# Patient Record
(demographics unavailable — no encounter records)

---

## 2024-10-24 NOTE — PHYSICAL EXAM
[de-identified] :  Gen: NAD Resp: Nonlabored respirations, no SOB   Shoulder: Skin intact Tender over posterolateral deltoid 90/60/10/lumbar AROM 4/5 ER IR 4p/5 Abduction (+) Franchesca/Christine (+) Belly press/bear hug (+) Speed/yergason 5/5 D B T EPL FDP IO SILT amur 2+ rad bcr   1+ ant/post instability (-) O'gerson's (-) load and release (-) apprehension (-) Steve Chavez (-) sulcus sign (-) AC pain, cross body adduction  [de-identified] :  The following radiographs were ordered and read by me during this patient's visit. I reviewed each radiograph in detail with the patient and discussed the findings as highlighted below.   3 views of the L + R shoulder were obtained today that show severe GH OA with large goatsbeard osteophyte, B2 glenoid on R shoulder. There are no degenerative changes seen. There is no malalignment. No obvious osseous abnormality. Otherwise unremarkable.

## 2024-10-24 NOTE — DISCUSSION/SUMMARY
[de-identified] : 74yF pw severe bl GH OA.  Holding off on surgical discussion as just had neuro meningioma and wants to wait.  The patient was extensively counseled on treatment options including but not limited to observation, rest/activity modification, bracing, anti-inflammatory medications, physical therapy, injections, and surgery.  The natural history of the disease was thoroughly explained.  We discussed that the majority of the time, this condition can be initially treated conservatively. The patient will proceed with: -PT -diclofenac rx provided - pt instructed to take with meals and not with other nsaid's including advil, aleve, and toradol.  The pt understands to stop taking the medication if any stomach sx develop or they develop any type of bleeding or bruising, at which point they will present to their PMD -pt was instructed on the importance of resting, icing and elevating to minimize swelling -RTC 6w   I have personally obtained the history, reviewed the ROS as noted, and performed the physical examination today.  The patient and I discussed the assessment and options and developed the plan.  All questions were answered and the patient stated their understanding of the treatment plan and appreciation of the visit.   My cumulative time spent on this patient's visit included: Preparation for the visit, review of the medical records, review of pertinent diagnostic studies, examination and counseling of the patient on the above diagnosis, treatment plan and prognosis, orders of diagnostic tests, medications and/or appropriate procedures and documentation in the medical records of today's visit.   Anuel Arias MD

## 2024-10-24 NOTE — HISTORY OF PRESENT ILLNESS
[de-identified] : Ciarra is a 73yo F pw bilateral shoulder pain. Ongoing pain for over a year, Denies any previous injury or hx of surgery. Pain is described as dull and constant, 8/10 and impacts quality of life; pain is worse with movement. Unable to lay down comfortably. Can't sleep due to pain. L slightly > R.  Has done HEP, no inj.  No adv imaging. Well controlled DM, A1c 6.4 [facility] .   8/22 - making some progress with PT but great pain relief from the injection on the R side.  CT L shoulder complete She has been getting worked up for a possible brain mass and would like to have that evaluated more first prior to any surgical discussions  10/24 - neuro workup resulted in benign meningioma - will be monitored w serial imaging x1y L shoulder pain still causing 7-8/10 persistent soreness, modest relief from combination of PT + inj Wants to consider surgery after holidays, reverse total shoulder

## 2024-11-25 NOTE — ADDENDUM
[FreeTextEntry1] : POCT glucose testing and Hemoglobin A1c was carried out today given diabetes diagnosis.  This note was written by So Guillen on 11/25/2024 acting as medical scribe for Dr. Mat Collado. I, Dr. Mat Collado, have read and attest that all the information, medical decision making and discharge instructions within are true and accurate.

## 2024-11-25 NOTE — HISTORY OF PRESENT ILLNESS
[FreeTextEntry1] : Ms. Gray is a 75-year-old female who returns today in follow up with regard to a history of type 2 diabetes mellitus. There is no known history of retinopathy, or nephropathy. With regard to neuropathy, she denies any neurologic s/s.  ROS: has b/l shoulder pain (dx arthritis on CT) and finished PT this past Saturday per ortho Dr. Arias. Did receive injection therapy with some improvement in addition to PT.   Current DM medication include Glimepiride 1.5 tablets in AM, Metformin 1000mg BID, and Repaglinide 2 mg up to two before lunch and usually one before dinner.   Home glucose monitoring has shown values of late to be She does deny any significant hypoglycemic signs and symptoms of late.  Diet: Is maintaining carb-consistent diet.  Activity: Is trying to stay physically active with walking, stretching, bicycle.   POCT A1C returned today at 6.2%.  POCT glucose today at 79 mg/dL.   Denies any chest pain, sob, neurologic or ophthalmologic complaints. He too denies any new podiatric concerns. Ophthalmologic evaluation is up to date with Dr. Yvonne Burgess- no diabetic changes noted. ____________________________________________________________________________________________________  Per Dr. Schultz, the patient underwent MR Head on 08/07/2024 which incidentally showed a left pituitary mass.   MR Angio Head on 09/09/2024 per Dr. Nissenbaum then showed that there is a hypoenhancing mass within the left side of the sella turcica measuring 1.5 cm anterior to posterior by 1.4 cm in width by 1.2 cm craniocaudad. Associated mild deviation of the pituitary infundibulum towards the right.  ____________________________________________________________________________________________________  The patent does also have a history of Nodular Thyroid.   She underwent Thyroid US on 10/23/2024 showing: - In the right lobe, there is a 10 x 6 x 7 mm upper pole solid and partially cystic nodule (TIRAD 2). There are multiple colloid cysts measuring up to 9 mm. - In the left lobe, there is a 1.2 cm cystic nodule as well as scattered less than 5 mm nodules (TIRAD 2).  Denies anterior neck discomfort, difficulty swallowing, or any change in the appearance of the neck region. ____________________________________________________________________________________________________  Additional diagnoses include that of hyperlipidemia, hypertension, and vitamin d deficiency.  Additional Medications: Rosuvastatin, Losartan 50mg, HCTZ 12.5mg Supplements: vitamin D3 5,000 IU daily, magnesium 250 mg, B-12 1000 daily, Dr Amanda TRISTAN.

## 2024-12-11 NOTE — HISTORY OF PRESENT ILLNESS
[FreeTextEntry1] : This is a 75 year y/o female with a PMHx of HTN, HLD, DM, LVH, CAD,   today coming in for follow up.  The patient denies fever, chills, sore throat, loss of taste or smell, muscle aches weight loss, malaise, rash, recent travel, insect bites, alteration bowel habits, headaches, weakness, abdominal pain, bloating, changes in urination, visual disturbances, shortness of breath, chest pain, dizziness, palpitations.  The patient here for evaluation of high blood pressure. Patient is currently tolerating the current antihypertensive regime and they deny headaches, stiff neck, visual changes, or PND. The patient has been trying to stay on a low-sodium diet.  The patient is here for follow-up of elevated cholesterol. Patient is currently tolerating medication and denies muscle pain, joint pain, back pain, urinary changes , nausea, vomiting, abdominal pain or diarrhea. The patient is trying to follow a low cholesterol diet.  The patient also presents for continued care of diabetes mellitus. Patient is following the diabetic regimen. Patient is tolerating hypoglycemic agents and following the diet. Patient denies any visual changes, blood in the urine, abdominal pain, nausea, vomiting, myalgias, arthralgias, paresthesias and syncope. The patient denies any chest discomfort, shortness of breath or new neurologic signs or symptoms. Patient denies any polyuria, worsening nocturia, or polydipsia.  The patient presents for routine follow up of their coronary artery disease. The patient has been following all secondary prevents measures and antiplatelet therapy. The patient denies shortness of breath, chest pain, dizziness, palpitations.  10/24/24- Thyroid sonogram- IMPRESSION: Normal-size thyroid gland without suspicious nodules. TI-RAD 2: Not suspicious (No FNA)  12/7/24- labs a1c 6.6, ldl 64

## 2025-04-03 NOTE — PHYSICAL EXAM
No Charge  D/C EEG wires at [facility]  Dr. Bean   [Well Developed] : well developed [Well Nourished] : well nourished [No Acute Distress] : no acute distress [Normal Conjunctiva] : normal conjunctiva [Normal Venous Pressure] : normal venous pressure [No Carotid Bruit] : no carotid bruit [Normal S1, S2] : normal S1, S2 [No Murmur] : no murmur [No Rub] : no rub [No Gallop] : no gallop [Clear Lung Fields] : clear lung fields [Good Air Entry] : good air entry [No Respiratory Distress] : no respiratory distress  [Soft] : abdomen soft [Non Tender] : non-tender [No Masses/organomegaly] : no masses/organomegaly [Normal Bowel Sounds] : normal bowel sounds [Normal Gait] : normal gait [No Edema] : no edema [No Cyanosis] : no cyanosis [No Clubbing] : no clubbing [No Varicosities] : no varicosities [No Rash] : no rash [No Skin Lesions] : no skin lesions [Moves all extremities] : moves all extremities [No Focal Deficits] : no focal deficits [Normal Speech] : normal speech [Alert and Oriented] : alert and oriented [Normal memory] : normal memory

## 2025-04-08 NOTE — HISTORY OF PRESENT ILLNESS
[FreeTextEntry1] : Ms. Gray is a 75-year-old female who returns today in follow up with regard to a history of type 2 diabetes mellitus. There is no known history of retinopathy, or nephropathy. With regard to neuropathy, she denies any neurologic s/s.  Has b/l shoulder pain (dx arthritis on CT) and finished PT 11/23/24 per ortho Dr. Arias. Did receive injection therapy with some improvement in addition to PT.   Current DM medication include Glimepiride 1.5 tablets in AM, Metformin 1000mg BID, and Repaglinide 2 mg two tabs pre-lunch and one tab pre-dinner. Overall, tolerating the medications well.  Home glucose monitoring has shown values of late to be 130-140. She does deny any significant hypoglycemic signs and symptoms of late.  Diet: Is maintaining carb-consistent diet.  Activity: Is trying to stay physically active with walking, stretching, bicycle.  Recntly returned from 1-month trip to [facility] and walked frequently.  POCT A1C returned today at 6.3%. Previously returned at 6.6% on 12/07/24. POCT glucose today at 103 mg/dL.  She denies polyuria, polydipsia, or any visual changes. She also denies any skin lesions, skin breakdown or non-healing areas of skin. She denies any podiatric concerns. Ophthalmologic evaluation is up to date q6 monthswith Dr. Yvonne Burgess- no diabetic changes noted. Had some glaucoma and is taking eye drops. ____________________________________________________________________________________________________ Per Dr. Schultz, the patient underwent MR Head on 08/07/2024 which incidentally showed a left pituitary mass.   MR Angio Head on 09/09/2024 per Dr. Nissenbaum then showed that there is a hypoenhancing mass within the left side of the sella turcica measuring 1.5 cm anterior to posterior by 1.4 cm in width by 1.2 cm craniocaudad. Associated mild deviation of the pituitary infundibulum towards the right.  Prolactin returned at 16.4 undiluted and 14.9 diluted on 12/07/24.  Patient underwent a Dexamethasone suppression test on 12/03/24 and cortisol returned fully suppressed at 1.2.  Pending updated MRI on 04/07/25. ____________________________________________________________________________________________________ The patent does also have a history of Nodular Thyroid.   She underwent Thyroid US on 10/23/2024 showing: - In the right lobe, there is a 10 x 6 x 7 mm upper pole solid and partially cystic nodule (TIRAD 2). There are multiple colloid cysts measuring up to 9 mm. - In the left lobe, there is a 1.2 cm cystic nodule as well as scattered less than 5 mm nodules (TIRAD 2).  Denies anterior neck discomfort, difficulty swallowing, or any change in the appearance of the neck region. ____________________________________________________________________________________________________ Too has history of Osteopenia.  DEXA scan 11/06/23 from did show T-scores: Spine: -2.2; previously -2.1 L Femoral neck: -2.0 L Total hip: -1.9; previously -1.4 FRAX ---> non-hip = 6.8%, and hip = 1.7% Comparison: 07/22/21  Denies any adult bone fractures. Denies FH of osteoporosis.  No known hx of serum calcium elevations or hx of kidney stones. She denies hx of prolonged corticosteroid usage. No hx of prolonged immobilization.  Denies use of PPI agents. No known celiac hx or any hx suggestive of malabsorption.  Does take D3 IU daily. Does not take calcium supplementation but does have adequate calcium in her diet. ____________________________________________________________________________________________________  Additional diagnoses include that of hyperlipidemia, hypertension, and vitamin d deficiency.  Additional Medications: Rosuvastatin, Losartan 50mg, HCTZ 12.5mg Supplements: vitamin D3 5,000 IU daily, magnesium 250 mg, B-12 1000 daily, MV   GI, Dr Patel.

## 2025-04-08 NOTE — ADDENDUM
[FreeTextEntry1] : POCT glucose testing and Hemoglobin A1c was carried out today given diabetes diagnosis.  This note was written by Selina Negrete on 04/03/2025 acting as medical scribe for Dr. Mat Collado. I, Dr. Mat Collado, have read and attest that all the information, medical decision making and discharge instructions within are true and accurate.

## 2025-04-08 NOTE — HISTORY OF PRESENT ILLNESS
[FreeTextEntry1] : Ms. Gray is a 75-year-old female who returns today in follow up with regard to a history of type 2 diabetes mellitus. There is no known history of retinopathy, or nephropathy. With regard to neuropathy, she denies any neurologic s/s.  Has b/l shoulder pain (dx arthritis on CT) and finished PT 11/23/24 per ortho Dr. Arias. Did receive injection therapy with some improvement in addition to PT.   Current DM medication include Glimepiride 1.5 tablets in AM, Metformin 1000mg BID, and Repaglinide 2 mg two tabs pre-lunch and one tab pre-dinner. Overall, tolerating the medications well.  Home glucose monitoring has shown values of late to be 130-140. She does deny any significant hypoglycemic signs and symptoms of late.  Diet: Is maintaining carb-consistent diet.  Activity: Is trying to stay physically active with walking, stretching, bicycle.  Recntly returned from 1-month trip to [facility] and walked frequently.  POCT A1C returned today at 6.3%. Previously returned at 6.6% on 12/07/24. POCT glucose today at 103 mg/dL.  She denies polyuria, polydipsia, or any visual changes. She also denies any skin lesions, skin breakdown or non-healing areas of skin. She denies any podiatric concerns. Ophthalmologic evaluation is up to date q6 monthswith Dr. Yvonne Burgess- no diabetic changes noted. Had some glaucoma and is taking eye drops. ____________________________________________________________________________________________________ Per Dr. Schultz, the patient underwent MR Head on 08/07/2024 which incidentally showed a left pituitary mass.   MR Angio Head on 09/09/2024 per Dr. Nissenbaum then showed that there is a hypoenhancing mass within the left side of the sella turcica measuring 1.5 cm anterior to posterior by 1.4 cm in width by 1.2 cm craniocaudad. Associated mild deviation of the pituitary infundibulum towards the right.  Prolactin returned at 16.4 undiluted and 14.9 diluted on 12/07/24.  Patient underwent a Dexamethasone suppression test on 12/03/24 and cortisol returned fully suppressed at 1.2.  Pending updated MRI on 04/07/25. ____________________________________________________________________________________________________ The patent does also have a history of Nodular Thyroid.   She underwent Thyroid US on 10/23/2024 showing: - In the right lobe, there is a 10 x 6 x 7 mm upper pole solid and partially cystic nodule (TIRAD 2). There are multiple colloid cysts measuring up to 9 mm. - In the left lobe, there is a 1.2 cm cystic nodule as well as scattered less than 5 mm nodules (TIRAD 2).  Denies anterior neck discomfort, difficulty swallowing, or any change in the appearance of the neck region. ____________________________________________________________________________________________________ Too has history of Osteopenia.  DEXA scan 11/06/23 from did show T-scores: Spine: -2.2; previously -2.1 L Femoral neck: -2.0 L Total hip: -1.9; previously -1.4 FRAX ---> non-hip = 6.8%, and hip = 1.7% Comparison: 07/22/21  Denies any adult bone fractures. Denies FH of osteoporosis.  No known hx of serum calcium elevations or hx of kidney stones. She denies hx of prolonged corticosteroid usage. No hx of prolonged immobilization.  Denies use of PPI agents. No known celiac hx or any hx suggestive of malabsorption.  Does take D3 IU daily. Does not take calcium supplementation but does have adequate calcium in her diet. ____________________________________________________________________________________________________  Additional diagnoses include that of hyperlipidemia, hypertension, and vitamin d deficiency.  Additional Medications: Rosuvastatin, Losartan 50mg, HCTZ 12.5mg Supplements: vitamin D3 5,000 IU daily, magnesium 250 mg, B-12 1000 daily, MV   GI, Dr Patel.

## 2025-06-23 NOTE — CONSULT LETTER
[Dear  ___] : Dear  [unfilled], [Consult Letter:] : I had the pleasure of evaluating your patient, [unfilled]. [Please see my note below.] : Please see my note below. [Consult Closing:] : Thank you very much for allowing me to participate in the care of this patient.  If you have any questions, please do not hesitate to contact me. [Sincerely,] : Sincerely, [FreeTextEntry3] : Swapna Sutton MD Otolaryngology and Cranial Base Surgery  Attending Physician- Department of Otolaryngology and Head & Neck Surgery  [facility] [facility] Office: [phone]  Fax: [phone]

## 2025-06-23 NOTE — PROCEDURE
[de-identified] : Dr. Sutton [de-identified] : Reason for nasal endoscopy: anterior rhinoscopy insufficient to account for symptoms.   Flexible scope #2 was used. Right nasal passage with normal inferior, middle and superior turbinates. Nasal passage patent with clear middle meatus and sphenoethmoid recess. Left bony spur. Left nasal passage with normal inferior, middle and superior turbinates. Nasal passage was patent with clear middle meatus and sphenoethmoid recess. No mucopurulence or polyps appreciated. Nasopharynx clear

## 2025-06-23 NOTE — ASSESSMENT
[FreeTextEntry1] : 76y/o female who is her for a pituitary tumor that she will be having removed with dr. Lopez on 7/22/25. She has some nasal congestion today from allergies but overall is doing well - nasal endoscopy with left bony spur otherwise WNL - discussed my role in the surgery including the nasal and sinus procedures that I will be performing in order to provide the neurosurgeon access to the pituitary tumor - expected post-op hospital course discussed including need for possible repair of spinal fluid leak and close observation for 2-3 days - post-op care consisting of nasal saline irrigations was reviewed, and [facility] sinus pamphlet was given to patient to obtain prior to surgery so they have ready to use on discharge home - discussed post-operative issues including nasal congestion, loss of sense of smell which should be temporary but can be permanent, bloody nasal drainage, facial pressure/headaches, bleeding, and infection - all questions answered and patient will call if any further concerns - plan to see patient on AM of surgery

## 2025-06-23 NOTE — HISTORY OF PRESENT ILLNESS
[de-identified] : 76y/o female who is her for a pituitary tumor that she will be having removed with dr. Lopez on 7/22/25. She states she has no nasal issues but today her nose is stuffy because of her allergies. She denies any nasal trauma or surgeries. She had a fall last September and had a brain scan and the pituitary tumor was an incidental finding. She has intermittent occipital headaches. She has a slight change in eye sight. Her sense of smell and taste is good.

## 2025-06-23 NOTE — END OF VISIT
[FreeTextEntry3] : I personally saw and examined Ms. EMERSON MCKINNON in detail this visit today. I personally reviewed the HPI, PMH, FH, SH, ROS and medications/allergies. I have spoken to TERRA Quintero regarding the history and have personally determined the assessment and plan of care, and documented this myself. I was present and participated in all key portions of the encounter and all procedures noted above. I have made changes in the body of the note where appropriate.   Attesting Faculty: See Attending Signature Below

## 2025-07-16 NOTE — HISTORY OF PRESENT ILLNESS
[FreeTextEntry1] : EMERSON MCKINNON is a 75 year old F w/ pmhx of HTN, HLD, DM, LVH, CAD,  who presents today for a routine follow up, and pre op clearance  I was asked to see this delightful patient today for Pre-op Evaluation  prior to  _pituitary adenoma removal____  with   Dr. Pitts___  at fax number   _______  .  The patient denies fever, cough, wheezing, sputum production, hemoptysis, dyspnea, congestion, diarrhea, constipation, nausea, vomiting, bright red blood per rectum, melena, angina, chest pain, palpitations, diaphoresis, PND, incontinence, frequency, urgency, dysuria, edema, joint pain, headache, weakness, numbness and dizziness. The date of the planned procedure is: ___7/22/2025________   The patient denies fever, chills, sore throat, loss of taste or smell, muscle aches weight loss, malaise, rash, recent travel, insect bites, alteration bowel habits, headaches, weakness, abdominal pain, bloating, changes in urination, visual disturbances, shortness of breath, chest pain, dizziness, palpitations.  The patient here for evaluation of high blood pressure. Patient is currently tolerating the current antihypertensive regime and they deny headaches, stiff neck, visual changes, or PND. The patient has been trying to stay on a low-sodium diet.  The patient is here for follow-up of elevated cholesterol. Patient is currently tolerating medication and denies muscle pain, joint pain, back pain, urinary changes , nausea, vomiting, abdominal pain or diarrhea. The patient is trying to follow a low cholesterol diet.  The patient also presents for continued care of diabetes mellitus. Patient is following the diabetic regimen. Patient is tolerating hypoglycemic agents and following the diet. Patient denies any visual changes, blood in the urine, abdominal pain, nausea, vomiting, myalgias, arthralgias, paresthesias and syncope. The patient denies any chest discomfort, shortness of breath or new neurologic signs or symptoms. Patient denies any polyuria, worsening nocturia, or polydipsia.  The patient presents for routine follow up of their coronary artery disease. The patient has been following all secondary prevents measures and antiplatelet therapy. The patient denies shortness of breath, chest pain, dizziness, palpitations.